# Patient Record
Sex: FEMALE | Race: WHITE
[De-identification: names, ages, dates, MRNs, and addresses within clinical notes are randomized per-mention and may not be internally consistent; named-entity substitution may affect disease eponyms.]

---

## 2019-10-09 ENCOUNTER — HOSPITAL ENCOUNTER (EMERGENCY)
Dept: HOSPITAL 15 - ER | Age: 29
Discharge: LEFT BEFORE DISCHARGE | End: 2019-10-09
Payer: COMMERCIAL

## 2019-10-09 VITALS — SYSTOLIC BLOOD PRESSURE: 105 MMHG | DIASTOLIC BLOOD PRESSURE: 56 MMHG

## 2019-10-09 VITALS — HEIGHT: 64 IN | BODY MASS INDEX: 18.78 KG/M2 | WEIGHT: 110 LBS

## 2019-10-09 DIAGNOSIS — Z53.21: ICD-10-CM

## 2019-10-09 DIAGNOSIS — R56.9: Primary | ICD-10-CM

## 2019-10-09 LAB
ALBUMIN SERPL-MCNC: 4 G/DL (ref 3.4–5)
ALP SERPL-CCNC: 92 U/L (ref 45–117)
ALT SERPL-CCNC: 14 U/L (ref 13–56)
ANION GAP SERPL CALCULATED.3IONS-SCNC: 7 MMOL/L (ref 5–15)
BILIRUB SERPL-MCNC: 0.3 MG/DL (ref 0.2–1)
BUN SERPL-MCNC: 8 MG/DL (ref 7–18)
BUN/CREAT SERPL: 10
CALCIUM SERPL-MCNC: 8.9 MG/DL (ref 8.5–10.1)
CHLORIDE SERPL-SCNC: 109 MMOL/L (ref 98–107)
CO2 SERPL-SCNC: 24 MMOL/L (ref 21–32)
GLUCOSE SERPL-MCNC: 89 MG/DL (ref 74–106)
HCT VFR BLD AUTO: 42 % (ref 36–46)
HGB BLD-MCNC: 14.3 G/DL (ref 12.2–16.2)
MCH RBC QN AUTO: 31 PG (ref 28–32)
MCV RBC AUTO: 91.5 FL (ref 80–100)
NRBC BLD QL AUTO: 0.1 %
POTASSIUM SERPL-SCNC: 3.6 MMOL/L (ref 3.5–5.1)
PROT SERPL-MCNC: 8 G/DL (ref 6.4–8.2)
SODIUM SERPL-SCNC: 140 MMOL/L (ref 136–145)

## 2019-10-09 PROCEDURE — 36415 COLL VENOUS BLD VENIPUNCTURE: CPT

## 2019-10-09 PROCEDURE — 80053 COMPREHEN METABOLIC PANEL: CPT

## 2019-10-09 PROCEDURE — 84702 CHORIONIC GONADOTROPIN TEST: CPT

## 2019-10-09 PROCEDURE — 85025 COMPLETE CBC W/AUTO DIFF WBC: CPT

## 2022-04-23 ENCOUNTER — HOSPITAL ENCOUNTER (EMERGENCY)
Dept: HOSPITAL 15 - ER | Age: 32
Discharge: LEFT BEFORE DISCHARGE | End: 2022-04-23
Payer: COMMERCIAL

## 2022-04-23 VITALS — DIASTOLIC BLOOD PRESSURE: 58 MMHG | SYSTOLIC BLOOD PRESSURE: 112 MMHG

## 2022-04-23 VITALS — HEIGHT: 62 IN | BODY MASS INDEX: 20.24 KG/M2 | WEIGHT: 110 LBS

## 2022-04-23 DIAGNOSIS — Z53.21: ICD-10-CM

## 2022-04-23 DIAGNOSIS — R10.31: Primary | ICD-10-CM

## 2022-04-23 LAB
ALBUMIN SERPL-MCNC: 3.5 G/DL (ref 3.4–5)
ALP SERPL-CCNC: 85 U/L (ref 45–117)
ALT SERPL-CCNC: 25 U/L (ref 13–56)
ANION GAP SERPL CALCULATED.3IONS-SCNC: 9 MMOL/L (ref 5–15)
BILIRUB SERPL-MCNC: 0.3 MG/DL (ref 0.2–1)
BUN SERPL-MCNC: 7 MG/DL (ref 7–18)
BUN/CREAT SERPL: 6.3
CALCIUM SERPL-MCNC: 9.2 MG/DL (ref 8.5–10.1)
CHLORIDE SERPL-SCNC: 108 MMOL/L (ref 98–107)
CO2 SERPL-SCNC: 22 MMOL/L (ref 21–32)
GLUCOSE SERPL-MCNC: 107 MG/DL (ref 74–106)
HCT VFR BLD AUTO: 43.5 % (ref 36–46)
HGB BLD-MCNC: 15.2 G/DL (ref 12.2–16.2)
LIPASE SERPL-CCNC: 113 U/L (ref 73–393)
MCH RBC QN AUTO: 31.4 PG (ref 28–32)
MCV RBC AUTO: 90.1 FL (ref 80–100)
NRBC BLD QL AUTO: 0.1 %
POTASSIUM SERPL-SCNC: 3.9 MMOL/L (ref 3.5–5.1)
PROT SERPL-MCNC: 7.8 G/DL (ref 6.4–8.2)
SODIUM SERPL-SCNC: 139 MMOL/L (ref 136–145)

## 2022-04-23 PROCEDURE — 80053 COMPREHEN METABOLIC PANEL: CPT

## 2022-04-23 PROCEDURE — 83690 ASSAY OF LIPASE: CPT

## 2022-04-23 PROCEDURE — 81001 URINALYSIS AUTO W/SCOPE: CPT

## 2022-04-23 PROCEDURE — 85025 COMPLETE CBC W/AUTO DIFF WBC: CPT

## 2022-04-23 PROCEDURE — 36415 COLL VENOUS BLD VENIPUNCTURE: CPT

## 2022-04-23 PROCEDURE — 84702 CHORIONIC GONADOTROPIN TEST: CPT

## 2023-06-12 ENCOUNTER — HOSPITAL ENCOUNTER (EMERGENCY)
Dept: HOSPITAL 15 - ER | Age: 33
Discharge: ANOTHER HOSPITAL | End: 2023-06-12
Payer: COMMERCIAL

## 2023-06-12 VITALS — DIASTOLIC BLOOD PRESSURE: 60 MMHG | SYSTOLIC BLOOD PRESSURE: 99 MMHG

## 2023-06-12 VITALS — BODY MASS INDEX: 24.61 KG/M2 | HEIGHT: 63 IN | WEIGHT: 138.89 LBS

## 2023-06-12 DIAGNOSIS — G45.9: ICD-10-CM

## 2023-06-12 DIAGNOSIS — R10.2: ICD-10-CM

## 2023-06-12 DIAGNOSIS — R10.11: ICD-10-CM

## 2023-06-12 DIAGNOSIS — R47.01: ICD-10-CM

## 2023-06-12 DIAGNOSIS — G40.A09: Primary | ICD-10-CM

## 2023-06-12 LAB
ALBUMIN SERPL-MCNC: 3.9 G/DL (ref 3.4–5)
ALCOHOL, URINE: < 3 MG/DL (ref 0–10)
ALP SERPL-CCNC: 96 U/L (ref 45–117)
ALT SERPL-CCNC: 23 U/L (ref 13–56)
AMPHETAMINES UR QL SCN: NEGATIVE
ANION GAP SERPL CALCULATED.3IONS-SCNC: 11 MMOL/L (ref 5–15)
BARBITURATES UR QL SCN: NEGATIVE
BENZODIAZ UR QL SCN: NEGATIVE
BILIRUB SERPL-MCNC: 0.4 MG/DL (ref 0.2–1)
BUN SERPL-MCNC: 9 MG/DL (ref 7–18)
BUN/CREAT SERPL: 7.9 (ref 10–20)
BZE UR QL SCN: NEGATIVE
CALCIUM SERPL-MCNC: 8.9 MG/DL (ref 8.5–10.1)
CANNABINOIDS UR QL SCN: NEGATIVE
CHLORIDE SERPL-SCNC: 110 MMOL/L (ref 98–107)
CO2 SERPL-SCNC: 23 MMOL/L (ref 21–32)
GLUCOSE SERPL-MCNC: 108 MG/DL (ref 74–106)
HCT VFR BLD AUTO: 42.1 % (ref 36–46)
HGB BLD-MCNC: 13.9 G/DL (ref 12.2–16.2)
LIPASE SERPL-CCNC: 318 U/L (ref 73–393)
MCH RBC QN AUTO: 30.6 PG (ref 28–32)
MCV RBC AUTO: 92.8 FL (ref 80–100)
NRBC BLD QL AUTO: 0.1 %
OPIATES UR QL SCN: NEGATIVE
PCP UR QL SCN: NEGATIVE
POTASSIUM SERPL-SCNC: 3.9 MMOL/L (ref 3.5–5.1)
PROT SERPL-MCNC: 7.8 G/DL (ref 6.4–8.2)
SODIUM SERPL-SCNC: 144 MMOL/L (ref 136–145)

## 2023-06-12 PROCEDURE — 76705 ECHO EXAM OF ABDOMEN: CPT

## 2023-06-12 PROCEDURE — 83690 ASSAY OF LIPASE: CPT

## 2023-06-12 PROCEDURE — 81025 URINE PREGNANCY TEST: CPT

## 2023-06-12 PROCEDURE — 81001 URINALYSIS AUTO W/SCOPE: CPT

## 2023-06-12 PROCEDURE — 96361 HYDRATE IV INFUSION ADD-ON: CPT

## 2023-06-12 PROCEDURE — 96365 THER/PROPH/DIAG IV INF INIT: CPT

## 2023-06-12 PROCEDURE — 74176 CT ABD & PELVIS W/O CONTRAST: CPT

## 2023-06-12 PROCEDURE — 96375 TX/PRO/DX INJ NEW DRUG ADDON: CPT

## 2023-06-12 PROCEDURE — 70450 CT HEAD/BRAIN W/O DYE: CPT

## 2023-06-12 PROCEDURE — 80307 DRUG TEST PRSMV CHEM ANLYZR: CPT

## 2023-06-12 PROCEDURE — 84702 CHORIONIC GONADOTROPIN TEST: CPT

## 2023-06-12 PROCEDURE — 99291 CRITICAL CARE FIRST HOUR: CPT

## 2023-06-12 PROCEDURE — 36415 COLL VENOUS BLD VENIPUNCTURE: CPT

## 2023-06-12 PROCEDURE — 80053 COMPREHEN METABOLIC PANEL: CPT

## 2023-06-12 PROCEDURE — 85025 COMPLETE CBC W/AUTO DIFF WBC: CPT

## 2023-06-17 ENCOUNTER — HOSPITAL ENCOUNTER (EMERGENCY)
Dept: HOSPITAL 15 - ER | Age: 33
LOS: 1 days | Discharge: ANOTHER HOSPITAL | End: 2023-06-18
Payer: COMMERCIAL

## 2023-06-17 VITALS — HEIGHT: 63 IN | WEIGHT: 130.29 LBS | BODY MASS INDEX: 23.09 KG/M2

## 2023-06-17 DIAGNOSIS — R56.9: Primary | ICD-10-CM

## 2023-06-17 DIAGNOSIS — R73.9: ICD-10-CM

## 2023-06-17 DIAGNOSIS — E83.51: ICD-10-CM

## 2023-06-17 DIAGNOSIS — E87.8: ICD-10-CM

## 2023-06-17 DIAGNOSIS — F32.9: ICD-10-CM

## 2023-06-17 DIAGNOSIS — Z20.822: ICD-10-CM

## 2023-06-17 LAB
ALBUMIN SERPL-MCNC: 3.7 G/DL (ref 3.4–5)
ALP SERPL-CCNC: 93 U/L (ref 45–117)
ALT SERPL-CCNC: 19 U/L (ref 13–56)
ANION GAP SERPL CALCULATED.3IONS-SCNC: 8 MMOL/L (ref 5–15)
APAP SERPL-MCNC: 23.9 UG/ML (ref 10–30)
BILIRUB SERPL-MCNC: 0.1 MG/DL (ref 0.2–1)
BUN SERPL-MCNC: 7 MG/DL (ref 7–18)
BUN/CREAT SERPL: 7.9 (ref 10–20)
CALCIUM SERPL-MCNC: 8.4 MG/DL (ref 8.5–10.1)
CHLORIDE SERPL-SCNC: 109 MMOL/L (ref 98–107)
CO2 SERPL-SCNC: 25 MMOL/L (ref 21–32)
GLUCOSE SERPL-MCNC: 107 MG/DL (ref 74–106)
HCT VFR BLD AUTO: 39.8 % (ref 36–46)
HGB BLD-MCNC: 13.6 G/DL (ref 12.2–16.2)
MAGNESIUM SERPL-MCNC: 2.1 MG/DL (ref 1.6–2.6)
MCH RBC QN AUTO: 31.4 PG (ref 28–32)
MCV RBC AUTO: 92.2 FL (ref 80–100)
NRBC BLD QL AUTO: 0.1 %
POTASSIUM SERPL-SCNC: 3.7 MMOL/L (ref 3.5–5.1)
PROT SERPL-MCNC: 7.7 G/DL (ref 6.4–8.2)
SALICYLATES SERPL-MCNC: < 1.7 MG/DL (ref 2.8–20)
SODIUM SERPL-SCNC: 142 MMOL/L (ref 136–145)

## 2023-06-17 PROCEDURE — 87426 SARSCOV CORONAVIRUS AG IA: CPT

## 2023-06-17 PROCEDURE — 80329 ANALGESICS NON-OPIOID 1 OR 2: CPT

## 2023-06-17 PROCEDURE — 80320 DRUG SCREEN QUANTALCOHOLS: CPT

## 2023-06-17 PROCEDURE — 96374 THER/PROPH/DIAG INJ IV PUSH: CPT

## 2023-06-17 PROCEDURE — 80053 COMPREHEN METABOLIC PANEL: CPT

## 2023-06-17 PROCEDURE — 36415 COLL VENOUS BLD VENIPUNCTURE: CPT

## 2023-06-17 PROCEDURE — 85025 COMPLETE CBC W/AUTO DIFF WBC: CPT

## 2023-06-17 PROCEDURE — 70450 CT HEAD/BRAIN W/O DYE: CPT

## 2023-06-17 PROCEDURE — 99291 CRITICAL CARE FIRST HOUR: CPT

## 2023-06-17 PROCEDURE — 83735 ASSAY OF MAGNESIUM: CPT

## 2023-06-18 VITALS — DIASTOLIC BLOOD PRESSURE: 56 MMHG | SYSTOLIC BLOOD PRESSURE: 104 MMHG

## 2024-09-27 ENCOUNTER — HOSPITAL ENCOUNTER (INPATIENT)
Dept: HOSPITAL 15 - ER | Age: 34
LOS: 1 days | Discharge: LEFT BEFORE DISCHARGE | DRG: 101 | End: 2024-09-28
Attending: NURSE PRACTITIONER | Admitting: NURSE PRACTITIONER
Payer: COMMERCIAL

## 2024-09-27 VITALS — OXYGEN SATURATION: 93 % | HEART RATE: 120 BPM | RESPIRATION RATE: 20 BRPM

## 2024-09-27 VITALS — HEART RATE: 130 BPM | OXYGEN SATURATION: 98 % | RESPIRATION RATE: 14 BRPM

## 2024-09-27 VITALS — WEIGHT: 160.28 LBS | BODY MASS INDEX: 25.16 KG/M2 | HEIGHT: 67 IN

## 2024-09-27 VITALS — TEMPERATURE: 98.2 F

## 2024-09-27 DIAGNOSIS — Z91.199: ICD-10-CM

## 2024-09-27 DIAGNOSIS — Z79.899: ICD-10-CM

## 2024-09-27 DIAGNOSIS — N39.0: ICD-10-CM

## 2024-09-27 DIAGNOSIS — Z53.29: ICD-10-CM

## 2024-09-27 DIAGNOSIS — D72.829: ICD-10-CM

## 2024-09-27 DIAGNOSIS — G40.909: Primary | ICD-10-CM

## 2024-09-27 LAB
ANION GAP SERPL CALCULATED.3IONS-SCNC: 9 MMOL/L (ref 5–15)
BUN SERPL-MCNC: 7 MG/DL (ref 9–23)
BUN/CREAT SERPL: 7.2 (ref 10–20)
CALCIUM SERPL-MCNC: 9.5 MG/DL (ref 8.7–10.4)
CHLORIDE SERPL-SCNC: 110 MMOL/L (ref 98–107)
CO2 SERPL-SCNC: 22 MMOL/L (ref 20–31)
GLUCOSE SERPL-MCNC: 108 MG/DL (ref 74–106)
HCT VFR BLD AUTO: 38.4 % (ref 36–46)
HGB BLD-MCNC: 13.4 G/DL (ref 12.2–16.2)
MCH RBC QN AUTO: 31.8 PG (ref 28–32)
MCV RBC AUTO: 91.4 FL (ref 80–100)
NRBC BLD QL AUTO: 0 %
POTASSIUM SERPL-SCNC: 3.7 MMOL/L (ref 3.5–5.1)
SODIUM SERPL-SCNC: 141 MMOL/L (ref 136–145)

## 2024-09-27 PROCEDURE — 85025 COMPLETE CBC W/AUTO DIFF WBC: CPT

## 2024-09-27 PROCEDURE — 80053 COMPREHEN METABOLIC PANEL: CPT

## 2024-09-27 PROCEDURE — 96368 THER/DIAG CONCURRENT INF: CPT

## 2024-09-27 PROCEDURE — 70450 CT HEAD/BRAIN W/O DYE: CPT

## 2024-09-27 PROCEDURE — 99291 CRITICAL CARE FIRST HOUR: CPT

## 2024-09-27 PROCEDURE — 84702 CHORIONIC GONADOTROPIN TEST: CPT

## 2024-09-27 PROCEDURE — 83605 ASSAY OF LACTIC ACID: CPT

## 2024-09-27 PROCEDURE — 81001 URINALYSIS AUTO W/SCOPE: CPT

## 2024-09-27 PROCEDURE — 96365 THER/PROPH/DIAG IV INF INIT: CPT

## 2024-09-27 PROCEDURE — 36415 COLL VENOUS BLD VENIPUNCTURE: CPT

## 2024-09-27 PROCEDURE — 80048 BASIC METABOLIC PNL TOTAL CA: CPT

## 2024-09-27 PROCEDURE — 96367 TX/PROPH/DG ADDL SEQ IV INF: CPT

## 2024-09-27 RX ADMIN — CEFTRIAXONE SODIUM ONE MLS/HR: 1 INJECTION, POWDER, FOR SOLUTION INTRAMUSCULAR; INTRAVENOUS at 14:43

## 2024-09-27 RX ADMIN — SODIUM CHLORIDE SCH ML: 9 INJECTION INTRAMUSCULAR; INTRAVENOUS; SUBCUTANEOUS at 22:25

## 2024-09-27 RX ADMIN — SODIUM CHLORIDE ONE MLS/HR: 0.9 INJECTION, SOLUTION INTRAVENOUS at 14:15

## 2024-09-27 RX ADMIN — SODIUM CHLORIDE ONE MLS/HR: 0.9 INJECTION, SOLUTION INTRAVENOUS at 14:31

## 2024-09-27 RX ADMIN — ACETAMINOPHEN ONE MG: 325 TABLET ORAL at 14:45

## 2024-09-27 RX ADMIN — LEVETIRACETAM ONE MLS/HR: 10 INJECTION INTRAVENOUS at 14:37

## 2024-09-27 RX ADMIN — LEVETIRACETAM SCH MLS/HR: 10 INJECTION INTRAVENOUS at 22:24

## 2024-09-28 VITALS
HEART RATE: 98 BPM | RESPIRATION RATE: 21 BRPM | SYSTOLIC BLOOD PRESSURE: 117 MMHG | DIASTOLIC BLOOD PRESSURE: 74 MMHG | OXYGEN SATURATION: 97 %

## 2024-09-28 LAB
ALBUMIN SERPL-MCNC: 4 G/DL (ref 3.2–4.8)
ALP SERPL-CCNC: 97 U/L (ref 46–116)
ALT SERPL-CCNC: 16 U/L (ref 7–40)
ANION GAP SERPL CALCULATED.3IONS-SCNC: 7 MMOL/L (ref 5–15)
BILIRUB SERPL-MCNC: 0.4 MG/DL (ref 0.2–1)
BUN SERPL-MCNC: < 5 MG/DL (ref 9–23)
BUN/CREAT SERPL: 6.6 (ref 10–20)
CALCIUM SERPL-MCNC: 9.1 MG/DL (ref 8.7–10.4)
CHLORIDE SERPL-SCNC: 111 MMOL/L (ref 98–107)
CO2 SERPL-SCNC: 22 MMOL/L (ref 20–31)
GLUCOSE SERPL-MCNC: 95 MG/DL (ref 74–106)
HCT VFR BLD AUTO: 37 % (ref 36–46)
HGB BLD-MCNC: 13 G/DL (ref 12.2–16.2)
MCH RBC QN AUTO: 31.8 PG (ref 28–32)
MCV RBC AUTO: 90.8 FL (ref 80–100)
NRBC BLD QL AUTO: 0 %
POTASSIUM SERPL-SCNC: 3.6 MMOL/L (ref 3.5–5.1)
PROT SERPL-MCNC: 6.9 G/DL (ref 5.7–8.2)
SODIUM SERPL-SCNC: 140 MMOL/L (ref 136–145)

## 2025-01-15 ENCOUNTER — HOSPITAL ENCOUNTER (EMERGENCY)
Dept: HOSPITAL 15 - ER | Age: 35
LOS: 1 days | Discharge: ANOTHER HOSPITAL | End: 2025-01-16
Payer: COMMERCIAL

## 2025-01-15 VITALS — BODY MASS INDEX: 22.53 KG/M2 | WEIGHT: 140.21 LBS | HEIGHT: 66 IN

## 2025-01-15 VITALS — RESPIRATION RATE: 16 BRPM | HEART RATE: 105 BPM | OXYGEN SATURATION: 98 %

## 2025-01-15 VITALS — RESPIRATION RATE: 14 BRPM | HEART RATE: 91 BPM

## 2025-01-15 DIAGNOSIS — Z91.52: ICD-10-CM

## 2025-01-15 DIAGNOSIS — F32.A: Primary | ICD-10-CM

## 2025-01-15 DIAGNOSIS — F41.9: ICD-10-CM

## 2025-01-15 DIAGNOSIS — R56.9: ICD-10-CM

## 2025-01-15 DIAGNOSIS — Z79.899: ICD-10-CM

## 2025-01-15 LAB
ANION GAP SERPL CALCULATED.3IONS-SCNC: 10 MMOL/L (ref 5–15)
BUN SERPL-MCNC: 7 MG/DL (ref 9–23)
BUN/CREAT SERPL: 7.9 (ref 10–20)
CALCIUM SERPL-MCNC: 10.6 MG/DL (ref 8.7–10.4)
CHLORIDE SERPL-SCNC: 107 MMOL/L (ref 98–107)
CO2 SERPL-SCNC: 23 MMOL/L (ref 20–31)
GLUCOSE SERPL-MCNC: 88 MG/DL (ref 74–106)
HCT VFR BLD AUTO: 46.3 % (ref 36–46)
HGB BLD-MCNC: 15.5 G/DL (ref 12.2–16.2)
MCH RBC QN AUTO: 31.3 PG (ref 28–32)
MCV RBC AUTO: 93.4 FL (ref 80–100)
NRBC BLD QL AUTO: 0.1 %
POTASSIUM SERPL-SCNC: 3.8 MMOL/L (ref 3.5–5.1)
PROT UR STRIP.AUTO-MCNC: (no result) MG/DL
SODIUM SERPL-SCNC: 140 MMOL/L (ref 136–145)
YEAST # UR AUTO: (no result) /HPF

## 2025-01-15 PROCEDURE — 80307 DRUG TEST PRSMV CHEM ANLYZR: CPT

## 2025-01-15 PROCEDURE — 36415 COLL VENOUS BLD VENIPUNCTURE: CPT

## 2025-01-15 PROCEDURE — 81001 URINALYSIS AUTO W/SCOPE: CPT

## 2025-01-15 PROCEDURE — 85025 COMPLETE CBC W/AUTO DIFF WBC: CPT

## 2025-01-15 PROCEDURE — 81025 URINE PREGNANCY TEST: CPT

## 2025-01-15 PROCEDURE — 80048 BASIC METABOLIC PNL TOTAL CA: CPT

## 2025-01-15 NOTE — ED.PDOC
Psychiatric


HPI Comments


34 year old female presents to the ED with chief complaint of anxiety. Patient 

reports that she has been experiencing recent bouts of depression, anxiety, and 

some SI for the past few days. Patient relays that she has not attempted to 

commit suicide in quite a few years now and has no plan as of now. Patient 

states she is  from her , but her children live with her. 

Patient notes her children are with their father at this time while she is in 

the ED. Patient reports she is taking Lamictal. Patient denies any HI, VH, AH, 

or plan.


Chief Complaint:  Anxiety


Time Seen by MD:  06:55


Primary Care Provider:  UNKNOWN


Reviewed Notes:  Nurses Notes, Medications, Allergies


Information Source:  Patient


Mode of Arrival:  EMS


Severity:  Able to Care for Self, Able to Control Self


Severity of Pain:  None


Severity of Mental Status:  Moderate


Severity of Symptoms:  Moderate


Timing:  Days


Duration:  Since onset


Prehospital treatment:  None


Presents with:  Depression, Anxiety, Suicidal Ideation


Ingestion:  None


Circumstance:  None


Current substance abuse:  None


Stressors:  Divorce


History of:  Depression, Anxiety





Past Medical History


PAST MEDICAL HISTORY:  Anxiety, Depression, Seizures


Past Medical History (Other):  


Pseudoseizures


Surgical History:  Denies all surgeries


GYN History:  Denies all GYN Hx





Family History


Family History:  Reviewed,noncontributory to illness, Unknown





Social History


Smoker:  Non-Smoker


Alcohol:  Denies ETOH Use


Drugs:  Denies Drug Use


Lives In:  Home





Constitutional:  denies: chills, diaphoresis, fatigue, fever, malaise, sweats, 

weakness, others


EENTM:  denies: blurred vision, double vision, ear bleeding, ear discharge, ear 

drainage, ear pain, ear ringing, eye pain, eye redness, hearing loss, mouth 

pain, mouth swelling, nasal discharge, nose bleeding, nose congestion, nose 

pain, photophobia, tearing, throat pain, throat swelling, voice changes, others


Respiratory:  denies: cough, hemoptysis, orthopnea, SOB at rest, shortness of 

breath, SOB with excertion, stridor, wheezing, others


Cardiovascular:  denies: chest pain, dizzy spells, diaphoresis, Dyspnea on 

exertion, edema, irregular heart beat, left arm pain, lightheadedness, 

palpitations, PND, syncope, others


Gastrointestinal:  denies: abdomen distended, abdominal pain, blood streaked 

bowels, constipated, diarrhea, dysphagia, difficulty swallowing, hematemesis, 

melena, nausea, poor appetite, poor fluid intake, rectal bleeding, rectal pain, 

vomiting, others


Genitourinary:  denies: abnormal vagina bleeding, burning, dyspareunia, dysuria,

flank pain, frequency, hematuria, incontinence, pain, pregnant, vagina 

discharge, urgency, others


Neurological:  denies: dizziness, fainting, headache, left sided numbness, left 

sided weakness, numbness, paresthesia, pre-existing deficit, right sided 

numbness, right sided weakness, seizure, speech problems, tingling, tremors, 

weakness, others


Musculoskeletal:  denies: back pain, gout, joint pain, joint swelling, muscle 

pain, muscle stiffness, neck pain, others


Integumetry:  denies: bruises, change in color, change in hair/nails, dryness, 

laceration, lesions, lumps, rash, wounds, others


Allergic/Immunocompromised:  denies: Difficulty Healing, Frequent Infections, 

Hives, Itching, others


Hematologic/Lymphatic:  denies: anemia, blood clots, easy bleeding, easy 

bruising, swollen glands, others


Endocrine:  denies: excessive hunger, excessive sweating, excessive thirst, 

excessive urination, flushing, intolerance to cold, intolerance to heat, 

unexplained weight gain, unexplained weight loss, others


Psychiatric:  reports: anxiety, depression, suicidal; denies: bipolar disorder, 

hopeless, panic disorder, schizophrenia, sleepless, others


All Other Systems:  Reviewed and Negative





Physical Exam


General Appearance:  Moderate Distress, Normal


HEENT:  Normal ENT Inspection, PERRL/EOMI


Neck:  Full Range of Motion, Non-Tender, Normal, Normal Inspection


Respiratory:  Chest Non-Tender, Lungs Clear, No Accessory Muscle Use, No 

Respiratory Distress, Normal Breath Sounds


Cardiovascular:  No Edema, No JVD, No Murmur, No Gallop, Normal Peripheral 

Pulses, Regular Rate/Rhythm


Breast Exam:  Deferred


Gastrointestinal:  No Organomegaly, Non Tender, No Pulsatile Mass, Normal Bowel 

Sounds, Soft


Genitalia:  Deferred


Pelvic:  Deferred


Rectal:  Deferred


Extremities:  No calf tenderness, Normal capillary refill, Normal inspection, 

Normal range of motion, Non-tender, No pedal edema


Musculoskeletal :  


   Apperance:  Normal


Neurologic:  Alert, CNs II-XII nml as Tested, No Motor Deficits, Normal Affect, 

Normal Mood, No Sensory Deficits


Cerebellar Function:  NOT DONE


Reflexes:  NOT DONE


Skin:  Dry, Normal Color, Warm


Peripheral Pulses:  3+ Radial (R), 3+ Radial (L)


Lymphatic:  No Adenopathy





Was a procedure done?


Was a procedure done?:  No





Psych Differential Dx


Psych. Differential Dx:  Anxiety, Depression, Suicidal





X-Ray, Labs, Meds, VS





                                   Vital Signs








  Date Time  Temp Pulse Resp B/P (MAP) Pulse Ox O2 Delivery O2 Flow Rate FiO2


 


1/15/25 05:05 98.6 130 27 121/71 (88) 100   





Patient alert.


History of psychiatric illness.


She is depressed.  


Vitals stable.  


Answering all questions.  


Moving all extremities.


She has not harmed herself.  


Reviewed her previous history.


Medically cleared.  


Psychiatric evaluation.


Time of 1ST Reevaluation:  07:55


Reevaluation 1ST:  Improved


Patient Education/Counseling:  Diagnosis, Treatment


Family Education/Counseling:  No Family Present


Additional Information


I reviewed the following notes from patient's past medical encounters: 9/27/24 

for seizure disorder





The following tests were ordered, and results were reviewed by me: 





Additional Information was gathered from interviewing the following independent 

historians: None





I reviewed and agreed with the following test results read by other providers: 

None





I discussed treatment and results with medical personnel.





Departure 1


Departure


Time of Disposition:  07:38


Impression:  


   Primary Impression:  


   Depression


   Qualified Codes:  F32.A - Depression, unspecified


Disposition:  30 STILL A PATIENT


Condition:  Good





Critical Care Note


Critical Care Time?:  No





Stability


Stability form required:  No





Heart Score


Heart Score:  








Heart Score Response (Comments) Value


 


History N/A 0


 


EKG N/A 0


 


Age N/A 0


 


Risk Factors N/A 0


 


Troponin N/A 0


 


Total  0














I personally scribed for TSERING GUEVARA MD ([email or web address]) on 1/15/25 at 07:00.  

Electronically submitted by Edward Cespedes ([email or web address]).


I personally scribed for TSERING GUEVARA MD ([email or web address]) on 1/15/25 at 07:01.  E

lectronically submitted by Edward Cespedes ([email or web address]).





TSERING GUEVARA MD           Russ 15, 2025 07:00

## 2025-01-15 NOTE — DVHINCON2
Date of Service if different f:  Russ 15, 2025





Consultation ([facility])


Appetite:  Poor


Appearance:  Stated age, Groomed, Clean


Psychomotor activity:  Restless


Behavioral:  Cooperative


Eye contact:  Appropriate


Speech:  Soft


Affect:  Mood Congruent


Mood:  Depressed, Anxious


Thought processes:  Linear/Goal-directed


Thought content:  WNL


Suicidal ideations:  Absent


Homicidal ideations:  Absent


Orientation:  Person, Place, Time


Memory intact:  Recent


Intellect:  Average


Abstractability:  WNL


Concentration:  Adequate


Attention:  Adequate


Judgement:  WNL


Insight:  Fair


Vitals





Vital Signs








  Date Time  Temp Pulse Resp B/P (MAP) Pulse Ox O2 Delivery O2 Flow Rate FiO2


 


1/15/25 09:52  105 16  98 Room Air* 0 21


 


1/15/25 09:51 98.7   113/78 (90)    





 98.7       








Medication adjusted:  No


Diagnosis: Bipolar disorder 

















Plan : Pt denies suicidal ideation with plan but feeling very depressed, 

overwhelmed and wants admission to psychiatric facility


Recommend transfer to psychiatric facility, she may go voluntary or [facility]150hold for

DTS. 


Continue current psychotropic medications: Lamictal 100mg po BID


History of Present Illness


Reason for Consult : anxiety, suicidal thoughts 








HPI : This is a 34-year-old female with hx of bipolar disorder presents to ED 

reporting anxiety. Patient was evaluated via telepsychiatry. She reports 

separation from  3 months ago and she cares for their 3 children alone, 

her 6-year-old has "autoimmune encephalitis" and requires a lot of care. She is 

feeling overwhelmed, racing thoughts, dark thoughts. Her daughter also wakes up 

every few hours and she is unable to sleep. She reports having suicidal thoughts

but she knows she should not have these because of her kids. She feels she 

cannot go home right now and asking for help. She denies homicidal thoughts. 


She is having depressive symptoms and no rosie currently. She denies hx of 

psychosis and currently denies auditory/visual hallucination or paranoia. She is

having little sleep but able to fall asleep. She reports poor appetite.  














Past Psychiatric History : She denies past psych admissions, 5150 holds or 

suicide attempts. She does have hx of self harming with cutting but it has been 

some years ago. She has a psychiatrist at [facility], last follow up a few 

days ago. She had a therapist appt yesterday, but therapist cancelled. She is 

prescribed Lamictal 100mg BID for the last 2 years. 














Past Medical History : She denies 














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 

She denies substance use history. She denies nicotine use. Grandmother had hx of

anxiety.











JACLYN RUSSELL DNP                 Russ 15, 2025 10:55

## 2025-01-16 VITALS
TEMPERATURE: 98.9 F | SYSTOLIC BLOOD PRESSURE: 108 MMHG | DIASTOLIC BLOOD PRESSURE: 70 MMHG | RESPIRATION RATE: 14 BRPM | OXYGEN SATURATION: 99 % | HEART RATE: 91 BPM